# Patient Record
Sex: FEMALE | Race: WHITE | NOT HISPANIC OR LATINO | URBAN - METROPOLITAN AREA
[De-identification: names, ages, dates, MRNs, and addresses within clinical notes are randomized per-mention and may not be internally consistent; named-entity substitution may affect disease eponyms.]

---

## 2017-03-01 ENCOUNTER — OUTPATIENT (OUTPATIENT)
Dept: OUTPATIENT SERVICES | Facility: HOSPITAL | Age: 32
LOS: 1 days | End: 2017-03-01
Payer: COMMERCIAL

## 2017-03-01 DIAGNOSIS — O26.899 OTHER SPECIFIED PREGNANCY RELATED CONDITIONS, UNSPECIFIED TRIMESTER: ICD-10-CM

## 2017-03-01 DIAGNOSIS — Z3A.00 WEEKS OF GESTATION OF PREGNANCY NOT SPECIFIED: ICD-10-CM

## 2017-03-01 PROCEDURE — 99213 OFFICE O/P EST LOW 20 MIN: CPT | Mod: 25

## 2017-03-01 PROCEDURE — 76815 OB US LIMITED FETUS(S): CPT | Mod: 26

## 2017-04-01 ENCOUNTER — INPATIENT (INPATIENT)
Facility: HOSPITAL | Age: 32
LOS: 0 days | Discharge: HOME | End: 2017-04-02
Attending: OBSTETRICS & GYNECOLOGY | Admitting: OBSTETRICS & GYNECOLOGY
Payer: COMMERCIAL

## 2017-04-01 VITALS — SYSTOLIC BLOOD PRESSURE: 104 MMHG | HEART RATE: 85 BPM | DIASTOLIC BLOOD PRESSURE: 56 MMHG

## 2017-04-01 DIAGNOSIS — O47.1 FALSE LABOR AT OR AFTER 37 COMPLETED WEEKS OF GESTATION: ICD-10-CM

## 2017-04-01 DIAGNOSIS — Z34.80 ENCOUNTER FOR SUPERVISION OF OTHER NORMAL PREGNANCY, UNSPECIFIED TRIMESTER: ICD-10-CM

## 2017-04-01 LAB
BLD GP AB SCN SERPL QL: POSITIVE — SIGNIFICANT CHANGE UP
HBV SURFACE AG SERPL QL IA: SIGNIFICANT CHANGE UP
HCT VFR BLD CALC: 36.4 % — SIGNIFICANT CHANGE UP (ref 34.5–45)
HCV AB S/CO SERPL IA: 0.09 S/CO — SIGNIFICANT CHANGE UP
HCV AB SERPL-IMP: SIGNIFICANT CHANGE UP
HGB BLD-MCNC: 11.7 G/DL — SIGNIFICANT CHANGE UP (ref 11.5–15.5)
HIV 1 & 2 AB SERPL IA.RAPID: SIGNIFICANT CHANGE UP
HIV 1+2 AB+HIV1 P24 AG SERPL QL IA: SIGNIFICANT CHANGE UP
MCHC RBC-ENTMCNC: 28.3 PG — SIGNIFICANT CHANGE UP (ref 27–34)
MCHC RBC-ENTMCNC: 32.1 GM/DL — SIGNIFICANT CHANGE UP (ref 32–36)
MCV RBC AUTO: 88.1 FL — SIGNIFICANT CHANGE UP (ref 80–100)
PLATELET # BLD AUTO: 183 K/UL — SIGNIFICANT CHANGE UP (ref 150–400)
RBC # BLD: 4.13 M/UL — SIGNIFICANT CHANGE UP (ref 3.8–5.2)
RBC # FLD: 13.5 % — SIGNIFICANT CHANGE UP (ref 10.3–14.5)
RH IG SCN BLD-IMP: NEGATIVE — SIGNIFICANT CHANGE UP
RH IG SCN BLD-IMP: NEGATIVE — SIGNIFICANT CHANGE UP
T PALLIDUM AB TITR SER: NEGATIVE — SIGNIFICANT CHANGE UP
WBC # BLD: 14.37 K/UL — HIGH (ref 3.8–10.5)
WBC # FLD AUTO: 14.37 K/UL — HIGH (ref 3.8–10.5)

## 2017-04-01 PROCEDURE — 59409 OBSTETRICAL CARE: CPT | Mod: U9

## 2017-04-01 PROCEDURE — 86077 PHYS BLOOD BANK SERV XMATCH: CPT

## 2017-04-01 RX ORDER — SODIUM CHLORIDE 9 MG/ML
3 INJECTION INTRAMUSCULAR; INTRAVENOUS; SUBCUTANEOUS EVERY 8 HOURS
Qty: 0 | Refills: 0 | Status: DISCONTINUED | OUTPATIENT
Start: 2017-04-01 | End: 2017-04-02

## 2017-04-01 RX ORDER — DOCUSATE SODIUM 100 MG
100 CAPSULE ORAL
Qty: 0 | Refills: 0 | Status: DISCONTINUED | OUTPATIENT
Start: 2017-04-01 | End: 2017-04-02

## 2017-04-01 RX ORDER — OXYTOCIN 10 UNIT/ML
41.67 VIAL (ML) INJECTION
Qty: 20 | Refills: 0 | Status: DISCONTINUED | OUTPATIENT
Start: 2017-04-01 | End: 2017-04-02

## 2017-04-01 RX ORDER — DIBUCAINE 1 %
1 OINTMENT (GRAM) RECTAL EVERY 4 HOURS
Qty: 0 | Refills: 0 | Status: DISCONTINUED | OUTPATIENT
Start: 2017-04-01 | End: 2017-04-02

## 2017-04-01 RX ORDER — HYDROCORTISONE 1 %
1 OINTMENT (GRAM) TOPICAL EVERY 4 HOURS
Qty: 0 | Refills: 0 | Status: DISCONTINUED | OUTPATIENT
Start: 2017-04-01 | End: 2017-04-02

## 2017-04-01 RX ORDER — HYDROCORTISONE 1 %
1 OINTMENT (GRAM) TOPICAL EVERY 4 HOURS
Qty: 0 | Refills: 0 | Status: DISCONTINUED | OUTPATIENT
Start: 2017-04-01 | End: 2017-04-01

## 2017-04-01 RX ORDER — TETANUS TOXOID, REDUCED DIPHTHERIA TOXOID AND ACELLULAR PERTUSSIS VACCINE, ADSORBED 5; 2.5; 8; 8; 2.5 [IU]/.5ML; [IU]/.5ML; UG/.5ML; UG/.5ML; UG/.5ML
0.5 SUSPENSION INTRAMUSCULAR ONCE
Qty: 0 | Refills: 0 | Status: DISCONTINUED | OUTPATIENT
Start: 2017-04-01 | End: 2017-04-02

## 2017-04-01 RX ORDER — AER TRAVELER 0.5 G/1
1 SOLUTION RECTAL; TOPICAL EVERY 4 HOURS
Qty: 0 | Refills: 0 | Status: DISCONTINUED | OUTPATIENT
Start: 2017-04-01 | End: 2017-04-01

## 2017-04-01 RX ORDER — DIPHENHYDRAMINE HCL 50 MG
25 CAPSULE ORAL EVERY 6 HOURS
Qty: 0 | Refills: 0 | Status: DISCONTINUED | OUTPATIENT
Start: 2017-04-01 | End: 2017-04-02

## 2017-04-01 RX ORDER — OXYCODONE HYDROCHLORIDE 5 MG/1
5 TABLET ORAL
Qty: 0 | Refills: 0 | Status: DISCONTINUED | OUTPATIENT
Start: 2017-04-01 | End: 2017-04-02

## 2017-04-01 RX ORDER — SIMETHICONE 80 MG/1
80 TABLET, CHEWABLE ORAL EVERY 6 HOURS
Qty: 0 | Refills: 0 | Status: DISCONTINUED | OUTPATIENT
Start: 2017-04-01 | End: 2017-04-02

## 2017-04-01 RX ORDER — PRAMOXINE HYDROCHLORIDE 150 MG/15G
1 AEROSOL, FOAM RECTAL EVERY 4 HOURS
Qty: 0 | Refills: 0 | Status: DISCONTINUED | OUTPATIENT
Start: 2017-04-01 | End: 2017-04-01

## 2017-04-01 RX ORDER — KETOROLAC TROMETHAMINE 30 MG/ML
30 SYRINGE (ML) INJECTION ONCE
Qty: 0 | Refills: 0 | Status: DISCONTINUED | OUTPATIENT
Start: 2017-04-01 | End: 2017-04-01

## 2017-04-01 RX ORDER — HEPARIN SODIUM 5000 [USP'U]/ML
5000 INJECTION INTRAVENOUS; SUBCUTANEOUS EVERY 12 HOURS
Qty: 0 | Refills: 0 | Status: DISCONTINUED | OUTPATIENT
Start: 2017-04-01 | End: 2017-04-01

## 2017-04-01 RX ORDER — LANOLIN
1 OINTMENT (GRAM) TOPICAL EVERY 6 HOURS
Qty: 0 | Refills: 0 | Status: DISCONTINUED | OUTPATIENT
Start: 2017-04-01 | End: 2017-04-02

## 2017-04-01 RX ORDER — IBUPROFEN 200 MG
600 TABLET ORAL EVERY 6 HOURS
Qty: 0 | Refills: 0 | Status: DISCONTINUED | OUTPATIENT
Start: 2017-04-01 | End: 2017-04-02

## 2017-04-01 RX ORDER — MAGNESIUM HYDROXIDE 400 MG/1
30 TABLET, CHEWABLE ORAL
Qty: 0 | Refills: 0 | Status: DISCONTINUED | OUTPATIENT
Start: 2017-04-01 | End: 2017-04-02

## 2017-04-01 RX ORDER — PRAMOXINE HYDROCHLORIDE 150 MG/15G
1 AEROSOL, FOAM RECTAL EVERY 4 HOURS
Qty: 0 | Refills: 0 | Status: DISCONTINUED | OUTPATIENT
Start: 2017-04-01 | End: 2017-04-02

## 2017-04-01 RX ORDER — DIBUCAINE 1 %
1 OINTMENT (GRAM) RECTAL EVERY 4 HOURS
Qty: 0 | Refills: 0 | Status: DISCONTINUED | OUTPATIENT
Start: 2017-04-01 | End: 2017-04-01

## 2017-04-01 RX ORDER — ACETAMINOPHEN 500 MG
975 TABLET ORAL EVERY 6 HOURS
Qty: 0 | Refills: 0 | Status: DISCONTINUED | OUTPATIENT
Start: 2017-04-01 | End: 2017-04-02

## 2017-04-01 RX ORDER — OXYCODONE HYDROCHLORIDE 5 MG/1
5 TABLET ORAL EVERY 4 HOURS
Qty: 0 | Refills: 0 | Status: DISCONTINUED | OUTPATIENT
Start: 2017-04-01 | End: 2017-04-02

## 2017-04-01 RX ORDER — AER TRAVELER 0.5 G/1
1 SOLUTION RECTAL; TOPICAL EVERY 4 HOURS
Qty: 0 | Refills: 0 | Status: DISCONTINUED | OUTPATIENT
Start: 2017-04-01 | End: 2017-04-02

## 2017-04-01 RX ORDER — GLYCERIN ADULT
1 SUPPOSITORY, RECTAL RECTAL AT BEDTIME
Qty: 0 | Refills: 0 | Status: DISCONTINUED | OUTPATIENT
Start: 2017-04-01 | End: 2017-04-02

## 2017-04-01 RX ORDER — SODIUM CHLORIDE 9 MG/ML
3 INJECTION INTRAMUSCULAR; INTRAVENOUS; SUBCUTANEOUS EVERY 8 HOURS
Qty: 0 | Refills: 0 | Status: DISCONTINUED | OUTPATIENT
Start: 2017-04-01 | End: 2017-04-01

## 2017-04-01 RX ADMIN — Medication 975 MILLIGRAM(S): at 08:50

## 2017-04-01 RX ADMIN — Medication 600 MILLIGRAM(S): at 20:05

## 2017-04-01 RX ADMIN — Medication 30 MILLIGRAM(S): at 05:56

## 2017-04-01 RX ADMIN — Medication 600 MILLIGRAM(S): at 13:48

## 2017-04-01 RX ADMIN — Medication 125 MILLIUNIT(S)/MIN: at 05:32

## 2017-04-01 RX ADMIN — Medication 975 MILLIGRAM(S): at 08:20

## 2017-04-01 RX ADMIN — Medication 600 MILLIGRAM(S): at 19:31

## 2017-04-01 RX ADMIN — Medication 600 MILLIGRAM(S): at 13:26

## 2017-04-01 RX ADMIN — Medication 30 MILLIGRAM(S): at 06:25

## 2017-04-02 VITALS
RESPIRATION RATE: 18 BRPM | TEMPERATURE: 98 F | DIASTOLIC BLOOD PRESSURE: 60 MMHG | HEART RATE: 75 BPM | OXYGEN SATURATION: 97 % | SYSTOLIC BLOOD PRESSURE: 98 MMHG

## 2017-04-02 LAB
HCT VFR BLD CALC: 32.4 % — LOW (ref 34.5–45)
HGB BLD-MCNC: 10.9 G/DL — LOW (ref 11.5–15.5)
KLEIHAUER-BETKE CALCULATION: 0.1 % — SIGNIFICANT CHANGE UP (ref 0–0.3)
RUBV IGG SER-ACNC: 3.2 INDEX — SIGNIFICANT CHANGE UP
RUBV IGG SER-IMP: POSITIVE — SIGNIFICANT CHANGE UP

## 2017-04-02 PROCEDURE — 86900 BLOOD TYPING SEROLOGIC ABO: CPT

## 2017-04-02 PROCEDURE — 86901 BLOOD TYPING SEROLOGIC RH(D): CPT

## 2017-04-02 PROCEDURE — 87389 HIV-1 AG W/HIV-1&-2 AB AG IA: CPT

## 2017-04-02 PROCEDURE — 86762 RUBELLA ANTIBODY: CPT

## 2017-04-02 PROCEDURE — 59025 FETAL NON-STRESS TEST: CPT

## 2017-04-02 PROCEDURE — 86850 RBC ANTIBODY SCREEN: CPT

## 2017-04-02 PROCEDURE — 87340 HEPATITIS B SURFACE AG IA: CPT

## 2017-04-02 PROCEDURE — G0463: CPT

## 2017-04-02 PROCEDURE — 59050 FETAL MONITOR W/REPORT: CPT

## 2017-04-02 PROCEDURE — 85027 COMPLETE CBC AUTOMATED: CPT

## 2017-04-02 PROCEDURE — 86780 TREPONEMA PALLIDUM: CPT

## 2017-04-02 PROCEDURE — 85460 HEMOGLOBIN FETAL: CPT

## 2017-04-02 PROCEDURE — 86803 HEPATITIS C AB TEST: CPT

## 2017-04-02 PROCEDURE — 86703 HIV-1/HIV-2 1 RESULT ANTBDY: CPT

## 2017-04-02 PROCEDURE — 85018 HEMOGLOBIN: CPT

## 2017-04-02 PROCEDURE — 86870 RBC ANTIBODY IDENTIFICATION: CPT

## 2017-04-02 RX ORDER — ACETAMINOPHEN 500 MG
3 TABLET ORAL
Qty: 0 | Refills: 0 | COMMUNITY
Start: 2017-04-02

## 2017-04-02 RX ORDER — IBUPROFEN 200 MG
1 TABLET ORAL
Qty: 0 | Refills: 0 | COMMUNITY
Start: 2017-04-02

## 2017-04-02 RX ADMIN — Medication 600 MILLIGRAM(S): at 01:28

## 2017-04-02 RX ADMIN — Medication 975 MILLIGRAM(S): at 01:30

## 2017-04-02 RX ADMIN — Medication 600 MILLIGRAM(S): at 02:00

## 2017-04-02 RX ADMIN — Medication 975 MILLIGRAM(S): at 02:00

## 2017-04-02 RX ADMIN — Medication 600 MILLIGRAM(S): at 17:13

## 2017-04-02 NOTE — DISCHARGE NOTE OB - PROVIDER TOKENS
FREE:[LAST:[low risk clinic],PHONE:[phone]],FAX:[(   )    -],ADDRESS:[[address] [place] [address]]]

## 2017-04-02 NOTE — DISCHARGE NOTE OB - MATERIALS PROVIDED
Breastfeeding Log/Breastfeeding Mother’s Support Group Information/  Immunization Record/Guide to Postpartum Care/Vaccinations/Back To Sleep Handout/Breastfeeding Guide and Packet/Shaken Baby Prevention Handout

## 2017-04-02 NOTE — DISCHARGE NOTE OB - CARE PROVIDER_API CALL
[facility],   [address] [place] [address]  2nd [place] [address]  Phone: [phone]  Fax: (   )    -

## 2017-04-02 NOTE — DISCHARGE NOTE OB - CARE PLAN
Principal Discharge DX:	Vaginal delivery  Goal:	return to normal state of health  Instructions for follow-up, activity and diet:	Please follow up with the low risk clinic in 4-6 weeks for a postpartum visit. Call the office [phone] for an appointment. No strenuous activity or heavy lifting. Nothing in the vagina such as tampons, douching, sex, hot tubs, baths, or pools for 6 weeks. Notify your doctor if you have an increasing in vaginal bleeding, difficulty urinating, uncontrolled pain, inability to tolerate regular food, fevers, nausea, vomiting, or any other concerns. Principal Discharge DX:	Vaginal delivery  Goal:	return to normal state of health  Instructions for follow-up, activity and diet:	Please follow up with the low risk clinic in 4-6 weeks for a postpartum visit. Call the office [phone] for an appointment. No strenuous activity or heavy lifting. Nothing in the vagina such as tampons, douching, sex, hot tubs, baths, or pools for 6 weeks. Notify your doctor if you have an increasing in vaginal bleeding, difficulty urinating, uncontrolled pain, inability to tolerate regular food, fevers, nausea, vomiting, or any other concerns.

## 2017-04-02 NOTE — DISCHARGE NOTE OB - HOSPITAL COURSE
uncomplicated  followed by unremarkable postpartum course.  Hct: 36.4->32.4. On postpartum day 2, the patient was ambulating, voiding, tolerating diet, pain controlled, afebrile, and with normal vital signs.

## 2017-04-02 NOTE — DISCHARGE NOTE OB - PATIENT PORTAL LINK FT
“You can access the Follow[facility] Patient Portal, offered by [facility], by registering with the following website: [email or web address]”

## 2017-04-02 NOTE — DISCHARGE NOTE OB - MEDICATION SUMMARY - MEDICATIONS TO TAKE
I will START or STAY ON the medications listed below when I get home from the hospital:    acetaminophen 325 mg oral tablet  -- 3 tab(s) by mouth every 6 hours  -- Indication: For pain control    ibuprofen 600 mg oral tablet  -- 1 tab(s) by mouth every 6 hours  -- Indication: For pain control    Prenatal Multivitamins with Folic Acid 1 mg oral tablet  -- 1 tab(s) by mouth once a day  -- Indication: For vitamin

## 2018-03-07 ENCOUNTER — APPOINTMENT (OUTPATIENT)
Dept: ANTEPARTUM | Facility: CLINIC | Age: 33
End: 2018-03-07
Payer: MEDICAID

## 2018-03-07 ENCOUNTER — ASOB RESULT (OUTPATIENT)
Age: 33
End: 2018-03-07

## 2018-03-07 PROCEDURE — 76801 OB US < 14 WKS SINGLE FETUS: CPT

## 2018-04-13 ENCOUNTER — ASOB RESULT (OUTPATIENT)
Age: 33
End: 2018-04-13

## 2018-04-13 ENCOUNTER — APPOINTMENT (OUTPATIENT)
Dept: ANTEPARTUM | Facility: CLINIC | Age: 33
End: 2018-04-13
Payer: MEDICAID

## 2018-04-13 PROCEDURE — 76816 OB US FOLLOW-UP PER FETUS: CPT

## 2018-05-14 ENCOUNTER — INPATIENT (INPATIENT)
Facility: HOSPITAL | Age: 33
LOS: 0 days | Discharge: HOME | End: 2018-05-15
Attending: SPECIALIST | Admitting: SPECIALIST

## 2018-05-14 VITALS — HEIGHT: 59 IN | WEIGHT: 147.71 LBS

## 2018-05-14 DIAGNOSIS — O61.9 FAILED INDUCTION OF LABOR, UNSPECIFIED: ICD-10-CM

## 2018-05-14 LAB
BASOPHILS # BLD AUTO: 0.06 K/UL — SIGNIFICANT CHANGE UP (ref 0–0.2)
BASOPHILS NFR BLD AUTO: 0.6 % — SIGNIFICANT CHANGE UP (ref 0–2)
EOSINOPHIL # BLD AUTO: 0.35 K/UL — SIGNIFICANT CHANGE UP (ref 0–0.5)
EOSINOPHIL NFR BLD AUTO: 3.5 % — SIGNIFICANT CHANGE UP (ref 0–6)
HCT VFR BLD CALC: 39.5 % — SIGNIFICANT CHANGE UP (ref 34.5–45)
HGB BLD-MCNC: 13.1 G/DL — SIGNIFICANT CHANGE UP (ref 11.5–15.5)
IMM GRANULOCYTES # BLD AUTO: 0.02 # — SIGNIFICANT CHANGE UP
IMM GRANULOCYTES NFR BLD AUTO: 0.2 % — SIGNIFICANT CHANGE UP (ref 0–1.5)
LYMPHOCYTES # BLD AUTO: 3.37 K/UL — HIGH (ref 1–3.3)
LYMPHOCYTES # BLD AUTO: 33.8 % — SIGNIFICANT CHANGE UP (ref 13–44)
MCHC RBC-ENTMCNC: 29.6 PG — SIGNIFICANT CHANGE UP (ref 27–34)
MCHC RBC-ENTMCNC: 33.2 % — SIGNIFICANT CHANGE UP (ref 32–36)
MCV RBC AUTO: 89.2 FL — SIGNIFICANT CHANGE UP (ref 80–100)
MONOCYTES # BLD AUTO: 0.6 K/UL — SIGNIFICANT CHANGE UP (ref 0–0.9)
MONOCYTES NFR BLD AUTO: 6 % — SIGNIFICANT CHANGE UP (ref 2–14)
NEUTROPHILS # BLD AUTO: 5.56 K/UL — SIGNIFICANT CHANGE UP (ref 1.8–7.4)
NEUTROPHILS NFR BLD AUTO: 55.9 % — SIGNIFICANT CHANGE UP (ref 43–77)
NRBC # FLD: 0 — SIGNIFICANT CHANGE UP
PLATELET # BLD AUTO: 257 K/UL — SIGNIFICANT CHANGE UP (ref 150–400)
PMV BLD: 11.2 FL — SIGNIFICANT CHANGE UP (ref 7–13)
RBC # BLD: 4.43 M/UL — SIGNIFICANT CHANGE UP (ref 3.8–5.2)
RBC # FLD: 12.4 % — SIGNIFICANT CHANGE UP (ref 10.3–14.5)
WBC # BLD: 9.96 K/UL — SIGNIFICANT CHANGE UP (ref 3.8–10.5)
WBC # FLD AUTO: 9.96 K/UL — SIGNIFICANT CHANGE UP (ref 3.8–10.5)

## 2018-05-14 RX ORDER — OXYTOCIN 10 UNIT/ML
333.33 VIAL (ML) INJECTION
Qty: 20 | Refills: 0 | Status: COMPLETED | OUTPATIENT
Start: 2018-05-14 | End: 2018-05-15

## 2018-05-14 RX ORDER — SODIUM CHLORIDE 9 MG/ML
1000 INJECTION, SOLUTION INTRAVENOUS ONCE
Qty: 0 | Refills: 0 | Status: DISCONTINUED | OUTPATIENT
Start: 2018-05-14 | End: 2018-05-15

## 2018-05-14 RX ORDER — OXYTOCIN 10 UNIT/ML
333.33 VIAL (ML) INJECTION
Qty: 20 | Refills: 0 | Status: COMPLETED | OUTPATIENT
Start: 2018-05-14

## 2018-05-14 RX ORDER — SODIUM CHLORIDE 9 MG/ML
1000 INJECTION, SOLUTION INTRAVENOUS
Qty: 0 | Refills: 0 | Status: DISCONTINUED | OUTPATIENT
Start: 2018-05-14 | End: 2018-05-15

## 2018-05-14 RX ORDER — CITRIC ACID/SODIUM CITRATE 300-500 MG
15 SOLUTION, ORAL ORAL EVERY 4 HOURS
Qty: 0 | Refills: 0 | Status: DISCONTINUED | OUTPATIENT
Start: 2018-05-14 | End: 2018-05-15

## 2018-05-14 NOTE — PERINATAL/NEONATAL BEREAVEMENT NOTE - DISPOSITION
pathology (less than 24 weeks) pathology (less than 24 weeks)/[place] [facility] [place]

## 2018-05-15 ENCOUNTER — TRANSCRIPTION ENCOUNTER (OUTPATIENT)
Age: 33
End: 2018-05-15

## 2018-05-15 VITALS — SYSTOLIC BLOOD PRESSURE: 92 MMHG | HEART RATE: 66 BPM | DIASTOLIC BLOOD PRESSURE: 62 MMHG | RESPIRATION RATE: 16 BRPM

## 2018-05-15 LAB
BLD GP AB SCN SERPL QL: NEGATIVE — SIGNIFICANT CHANGE UP
RBC # BLD FETUS AUTO: 0 — SIGNIFICANT CHANGE UP
RH IG SCN BLD-IMP: NEGATIVE — SIGNIFICANT CHANGE UP

## 2018-05-15 RX ORDER — PRAMOXINE HYDROCHLORIDE 150 MG/15G
1 AEROSOL, FOAM RECTAL EVERY 4 HOURS
Qty: 0 | Refills: 0 | Status: DISCONTINUED | OUTPATIENT
Start: 2018-05-15 | End: 2018-05-15

## 2018-05-15 RX ORDER — DIBUCAINE 1 %
1 OINTMENT (GRAM) RECTAL EVERY 4 HOURS
Qty: 0 | Refills: 0 | Status: DISCONTINUED | OUTPATIENT
Start: 2018-05-15 | End: 2018-05-15

## 2018-05-15 RX ORDER — HYDROCORTISONE 1 %
1 OINTMENT (GRAM) TOPICAL EVERY 4 HOURS
Qty: 0 | Refills: 0 | Status: DISCONTINUED | OUTPATIENT
Start: 2018-05-15 | End: 2018-05-15

## 2018-05-15 RX ORDER — AER TRAVELER 0.5 G/1
1 SOLUTION RECTAL; TOPICAL EVERY 4 HOURS
Qty: 0 | Refills: 0 | Status: DISCONTINUED | OUTPATIENT
Start: 2018-05-15 | End: 2018-05-15

## 2018-05-15 RX ORDER — OXYTOCIN 10 UNIT/ML
41.67 VIAL (ML) INJECTION
Qty: 20 | Refills: 0 | Status: DISCONTINUED | OUTPATIENT
Start: 2018-05-15 | End: 2018-05-15

## 2018-05-15 RX ORDER — ACETAMINOPHEN 500 MG
1000 TABLET ORAL ONCE
Qty: 0 | Refills: 0 | Status: COMPLETED | OUTPATIENT
Start: 2018-05-15 | End: 2018-05-15

## 2018-05-15 RX ORDER — KETOROLAC TROMETHAMINE 30 MG/ML
30 SYRINGE (ML) INJECTION ONCE
Qty: 0 | Refills: 0 | Status: DISCONTINUED | OUTPATIENT
Start: 2018-05-15 | End: 2018-05-15

## 2018-05-15 RX ORDER — SODIUM CHLORIDE 9 MG/ML
3 INJECTION INTRAMUSCULAR; INTRAVENOUS; SUBCUTANEOUS EVERY 8 HOURS
Qty: 0 | Refills: 0 | Status: DISCONTINUED | OUTPATIENT
Start: 2018-05-15 | End: 2018-05-15

## 2018-05-15 RX ADMIN — Medication 400 MILLIGRAM(S): at 03:59

## 2018-05-15 RX ADMIN — SODIUM CHLORIDE 125 MILLILITER(S): 9 INJECTION, SOLUTION INTRAVENOUS at 00:00

## 2018-05-15 RX ADMIN — Medication 1000 MILLIUNIT(S)/MIN: at 05:35

## 2018-05-15 RX ADMIN — Medication 1000 MILLIGRAM(S): at 05:22

## 2018-05-15 RX ADMIN — Medication 125 MILLIUNIT(S)/MIN: at 06:10

## 2018-05-15 NOTE — DISCHARGE NOTE OB - CARE PROVIDER_API CALL
Zane Main), Obstetrics and Gynecology  [address] [place] [address]  Phone: [phone]  Fax: [phone]

## 2018-05-15 NOTE — DISCHARGE NOTE OB - PATIENT PORTAL LINK FT
You can access the [facility] Patient Portal, offered by [facility], by registering with the following website: [email or web address]

## 2018-05-15 NOTE — DISCHARGE NOTE OB - CARE PLAN
Principal Discharge DX:	Normal vaginal delivery  Goal:	discharge 5/15/18  Assessment and plan of treatment:	 IUFD

## 2018-05-15 NOTE — DISCHARGE NOTE OB - MEDICATION SUMMARY - MEDICATIONS TO TAKE
I will START or STAY ON the medications listed below when I get home from the hospital:    acetaminophen 325 mg oral tablet  -- 3 tab(s) by mouth every 6 hours  -- Indication: For pain    ibuprofen 600 mg oral tablet  -- 1 tab(s) by mouth every 6 hours  -- Indication: For pain    Prenatal Multivitamins with Folic Acid 1 mg oral tablet  -- 1 tab(s) by mouth once a day  -- Indication: For vitamin

## 2018-05-16 LAB
CMV IGM FLD-ACNC: <8 AU/ML — SIGNIFICANT CHANGE UP
CMV IGM SERPL QL: NEGATIVE — SIGNIFICANT CHANGE UP
MEV IGM SER-ACNC: <20 AU/ML — SIGNIFICANT CHANGE UP (ref 0–19.9)
T GONDII IGM SER QL: <3 AU/ML — SIGNIFICANT CHANGE UP
T GONDII IGM SER QL: NEGATIVE — SIGNIFICANT CHANGE UP
T PALLIDUM AB TITR SER: NEGATIVE — SIGNIFICANT CHANGE UP

## 2018-05-17 LAB
B19V IGG SER QL: NEGATIVE — SIGNIFICANT CHANGE UP
B19V IGG SER-ACNC: 0.2 INDEX — SIGNIFICANT CHANGE UP (ref 0–0.8)
B19V IGM FLD-ACNC: 0.2 INDEX — SIGNIFICANT CHANGE UP (ref 0–0.8)
B19V IGM SER-ACNC: NEGATIVE — SIGNIFICANT CHANGE UP

## 2018-07-19 NOTE — DISCHARGE NOTE OB - PLAN OF CARE
Adequate: hears normal conversation without difficulty return to normal state of health Please follow up with the low risk clinic in 4-6 weeks for a postpartum visit. Call the office [phone] for an appointment. No strenuous activity or heavy lifting. Nothing in the vagina such as tampons, douching, sex, hot tubs, baths, or pools for 6 weeks. Notify your doctor if you have an increasing in vaginal bleeding, difficulty urinating, uncontrolled pain, inability to tolerate regular food, fevers, nausea, vomiting, or any other concerns.

## 2018-11-26 ENCOUNTER — ASOB RESULT (OUTPATIENT)
Age: 33
End: 2018-11-26

## 2018-11-26 ENCOUNTER — APPOINTMENT (OUTPATIENT)
Dept: ANTEPARTUM | Facility: CLINIC | Age: 33
End: 2018-11-26
Payer: MEDICAID

## 2018-11-26 PROCEDURE — 76811 OB US DETAILED SNGL FETUS: CPT

## 2019-04-02 ENCOUNTER — TRANSCRIPTION ENCOUNTER (OUTPATIENT)
Age: 34
End: 2019-04-02

## 2019-04-02 ENCOUNTER — OUTPATIENT (OUTPATIENT)
Dept: INPATIENT UNIT | Facility: HOSPITAL | Age: 34
LOS: 1 days | Discharge: HOME | End: 2019-04-02
Payer: MEDICAID

## 2019-04-02 ENCOUNTER — INPATIENT (INPATIENT)
Facility: HOSPITAL | Age: 34
LOS: 1 days | Discharge: HOME | End: 2019-04-04
Attending: SPECIALIST | Admitting: SPECIALIST

## 2019-04-02 ENCOUNTER — OUTPATIENT (OUTPATIENT)
Dept: INPATIENT UNIT | Facility: HOSPITAL | Age: 34
LOS: 1 days | Discharge: HOME | End: 2019-04-02

## 2019-04-02 VITALS
HEART RATE: 90 BPM | RESPIRATION RATE: 16 BRPM | DIASTOLIC BLOOD PRESSURE: 66 MMHG | TEMPERATURE: 99 F | SYSTOLIC BLOOD PRESSURE: 124 MMHG

## 2019-04-02 VITALS — HEART RATE: 96 BPM | SYSTOLIC BLOOD PRESSURE: 106 MMHG | DIASTOLIC BLOOD PRESSURE: 67 MMHG

## 2019-04-02 VITALS
TEMPERATURE: 98 F | OXYGEN SATURATION: 100 % | DIASTOLIC BLOOD PRESSURE: 57 MMHG | HEART RATE: 94 BPM | RESPIRATION RATE: 16 BRPM | SYSTOLIC BLOOD PRESSURE: 105 MMHG

## 2019-04-02 VITALS — HEART RATE: 82 BPM | SYSTOLIC BLOOD PRESSURE: 107 MMHG | DIASTOLIC BLOOD PRESSURE: 62 MMHG

## 2019-04-02 VITALS
HEART RATE: 92 BPM | SYSTOLIC BLOOD PRESSURE: 126 MMHG | DIASTOLIC BLOOD PRESSURE: 74 MMHG | TEMPERATURE: 99 F | RESPIRATION RATE: 16 BRPM

## 2019-04-02 DIAGNOSIS — Z3A.00 WEEKS OF GESTATION OF PREGNANCY NOT SPECIFIED: ICD-10-CM

## 2019-04-02 DIAGNOSIS — O26.899 OTHER SPECIFIED PREGNANCY RELATED CONDITIONS, UNSPECIFIED TRIMESTER: ICD-10-CM

## 2019-04-02 DIAGNOSIS — Z98.891 HISTORY OF UTERINE SCAR FROM PREVIOUS SURGERY: Chronic | ICD-10-CM

## 2019-04-02 PROCEDURE — 59025 FETAL NON-STRESS TEST: CPT | Mod: 26

## 2019-04-02 NOTE — OB PROVIDER TRIAGE NOTE - NS_OBGYNHISTORY_OBGYN_ALL_OB_FT
obhx: 2011 primary c/s FTP-FT- 9lbs   - FT- 9lbs    - FT- 7lbs  2015  - FT- 7lbs  2016 SAB- complete  2017  - FT- 7lbs 6  2018- SAB- complete    gynhx: anastasia

## 2019-04-02 NOTE — OB PROVIDER TRIAGE NOTE - NSHPPHYSICALEXAM_GEN_ALL_CORE
VSS  abdomen soft nontender gravid   moderate variability accelerations no decelerations occasional contractions noted     VE performed by Hilario Sosa MD  patient noted to be 4/60/-2

## 2019-04-02 NOTE — OB PROVIDER TRIAGE NOTE - ADDITIONAL INSTRUCTIONS
Fetal kick counts discussed  labor precautions discussed  patient to return in two hours    patient stable

## 2019-04-02 NOTE — OB RN TRIAGE NOTE - PMH
Normal vaginal delivery  2012 tolac 9lbs  2014 tolac 7lbs  2015 tolac 7lbs  2017 tolac 7-6  Spontaneous   x2

## 2019-04-02 NOTE — OB PROVIDER TRIAGE NOTE - NSOBPROVIDERNOTE_OBGYN_ALL_OB_FT
VE performed at patient bedside with MD. Sosa patient noted to be 4/80/-2  Patient seen by Dr. Sosa at patient bedside  Plan of care discussed VE performed at patient bedside with MD. Sosa patient noted to be 4/80/-2      Sonogram performed  BPP 8/8  BECKIE 11.02  Cephalic; Posterior Placenta  Patient seen by Dr. Sosa at patient bedside  Plan of care discussed  Patient to ambulate    Fetal kick counts discussed  labor precautions discussed  patient to return in two hours

## 2019-04-02 NOTE — OB RN TRIAGE NOTE - PMH
Normal vaginal delivery  2012 tolac 9lbs  2014 tolac 7lbs  2015 tolac 7lbs  2017 tolac 7-6 Normal vaginal delivery  2012 tolac 9lbs  2014 tolac 7lbs  2015 tolac 7lbs  2017 tolac 7-6  Spontaneous   x2

## 2019-04-02 NOTE — OB PROVIDER TRIAGE NOTE - HISTORY OF PRESENT ILLNESS
34y.o.  at 40.2weeks presenting with irregular contractions beginning yesterday evening at 1900 growing in intensity.  Patient reports going to MD office for evaluation of contractions patient noted to be 4cm in office at 10am.  Patient denies LOF, denies vaginal bleeding and reports good fetal movement.     a/p course uncomplicated

## 2019-04-02 NOTE — OB PROVIDER TRIAGE NOTE - LABOR: CERVICAL EFFACEMENT
right sided flank pain.  As per pt saw PMD and started on Cipro, but feels its not helping.  Pt also c/o chest pain on and off since last night
50-74%

## 2019-04-03 LAB
BASOPHILS # BLD AUTO: 0.06 K/UL — SIGNIFICANT CHANGE UP (ref 0–0.2)
BASOPHILS NFR BLD AUTO: 0.5 % — SIGNIFICANT CHANGE UP (ref 0–2)
BLD GP AB SCN SERPL QL: NEGATIVE — SIGNIFICANT CHANGE UP
EOSINOPHIL # BLD AUTO: 0.19 K/UL — SIGNIFICANT CHANGE UP (ref 0–0.5)
EOSINOPHIL NFR BLD AUTO: 1.5 % — SIGNIFICANT CHANGE UP (ref 0–6)
HCT VFR BLD CALC: 40.7 % — SIGNIFICANT CHANGE UP (ref 34.5–45)
HGB BLD-MCNC: 13.1 G/DL — SIGNIFICANT CHANGE UP (ref 11.5–15.5)
IMM GRANULOCYTES NFR BLD AUTO: 0.4 % — SIGNIFICANT CHANGE UP (ref 0–1.5)
LYMPHOCYTES # BLD AUTO: 2.67 K/UL — SIGNIFICANT CHANGE UP (ref 1–3.3)
LYMPHOCYTES # BLD AUTO: 21.7 % — SIGNIFICANT CHANGE UP (ref 13–44)
MCHC RBC-ENTMCNC: 27.6 PG — SIGNIFICANT CHANGE UP (ref 27–34)
MCHC RBC-ENTMCNC: 32.2 % — SIGNIFICANT CHANGE UP (ref 32–36)
MCV RBC AUTO: 85.7 FL — SIGNIFICANT CHANGE UP (ref 80–100)
MONOCYTES # BLD AUTO: 0.74 K/UL — SIGNIFICANT CHANGE UP (ref 0–0.9)
MONOCYTES NFR BLD AUTO: 6 % — SIGNIFICANT CHANGE UP (ref 2–14)
NEUTROPHILS # BLD AUTO: 8.61 K/UL — HIGH (ref 1.8–7.4)
NEUTROPHILS NFR BLD AUTO: 69.9 % — SIGNIFICANT CHANGE UP (ref 43–77)
NRBC # FLD: 0 K/UL — SIGNIFICANT CHANGE UP (ref 0–0)
PLATELET # BLD AUTO: 250 K/UL — SIGNIFICANT CHANGE UP (ref 150–400)
PMV BLD: 12.1 FL — SIGNIFICANT CHANGE UP (ref 7–13)
RBC # BLD FETUS AUTO: 0 — SIGNIFICANT CHANGE UP
RBC # BLD: 4.75 M/UL — SIGNIFICANT CHANGE UP (ref 3.8–5.2)
RBC # FLD: 13.8 % — SIGNIFICANT CHANGE UP (ref 10.3–14.5)
RH IG SCN BLD-IMP: NEGATIVE — SIGNIFICANT CHANGE UP
T PALLIDUM AB TITR SER: NEGATIVE — SIGNIFICANT CHANGE UP
WBC # BLD: 12.32 K/UL — HIGH (ref 3.8–10.5)
WBC # FLD AUTO: 12.32 K/UL — HIGH (ref 3.8–10.5)

## 2019-04-03 RX ORDER — AER TRAVELER 0.5 G/1
1 SOLUTION RECTAL; TOPICAL EVERY 4 HOURS
Qty: 0 | Refills: 0 | Status: DISCONTINUED | OUTPATIENT
Start: 2019-04-03 | End: 2019-04-03

## 2019-04-03 RX ORDER — ACETAMINOPHEN 500 MG
975 TABLET ORAL EVERY 6 HOURS
Qty: 0 | Refills: 0 | Status: DISCONTINUED | OUTPATIENT
Start: 2019-04-03 | End: 2019-04-04

## 2019-04-03 RX ORDER — DOCUSATE SODIUM 100 MG
100 CAPSULE ORAL
Qty: 0 | Refills: 0 | Status: DISCONTINUED | OUTPATIENT
Start: 2019-04-03 | End: 2019-04-04

## 2019-04-03 RX ORDER — SODIUM CHLORIDE 9 MG/ML
1000 INJECTION, SOLUTION INTRAVENOUS
Qty: 0 | Refills: 0 | Status: DISCONTINUED | OUTPATIENT
Start: 2019-04-03 | End: 2019-04-03

## 2019-04-03 RX ORDER — MAGNESIUM HYDROXIDE 400 MG/1
30 TABLET, CHEWABLE ORAL
Qty: 0 | Refills: 0 | Status: DISCONTINUED | OUTPATIENT
Start: 2019-04-03 | End: 2019-04-04

## 2019-04-03 RX ORDER — ACETAMINOPHEN 500 MG
2 TABLET ORAL
Qty: 0 | Refills: 0 | COMMUNITY

## 2019-04-03 RX ORDER — SIMETHICONE 80 MG/1
80 TABLET, CHEWABLE ORAL EVERY 6 HOURS
Qty: 0 | Refills: 0 | Status: DISCONTINUED | OUTPATIENT
Start: 2019-04-03 | End: 2019-04-04

## 2019-04-03 RX ORDER — TETANUS TOXOID, REDUCED DIPHTHERIA TOXOID AND ACELLULAR PERTUSSIS VACCINE, ADSORBED 5; 2.5; 8; 8; 2.5 [IU]/.5ML; [IU]/.5ML; UG/.5ML; UG/.5ML; UG/.5ML
0.5 SUSPENSION INTRAMUSCULAR ONCE
Qty: 0 | Refills: 0 | Status: DISCONTINUED | OUTPATIENT
Start: 2019-04-03 | End: 2019-04-04

## 2019-04-03 RX ORDER — IBUPROFEN 200 MG
600 TABLET ORAL EVERY 6 HOURS
Qty: 0 | Refills: 0 | Status: COMPLETED | OUTPATIENT
Start: 2019-04-03 | End: 2020-03-01

## 2019-04-03 RX ORDER — GLYCERIN ADULT
1 SUPPOSITORY, RECTAL RECTAL AT BEDTIME
Qty: 0 | Refills: 0 | Status: DISCONTINUED | OUTPATIENT
Start: 2019-04-03 | End: 2019-04-04

## 2019-04-03 RX ORDER — PRAMOXINE HYDROCHLORIDE 150 MG/15G
1 AEROSOL, FOAM RECTAL EVERY 4 HOURS
Qty: 0 | Refills: 0 | Status: DISCONTINUED | OUTPATIENT
Start: 2019-04-03 | End: 2019-04-03

## 2019-04-03 RX ORDER — HYDROCORTISONE 1 %
1 OINTMENT (GRAM) TOPICAL EVERY 4 HOURS
Qty: 0 | Refills: 0 | Status: DISCONTINUED | OUTPATIENT
Start: 2019-04-03 | End: 2019-04-03

## 2019-04-03 RX ORDER — SODIUM CHLORIDE 9 MG/ML
125 INJECTION, SOLUTION INTRAVENOUS ONCE
Qty: 0 | Refills: 0 | Status: DISCONTINUED | OUTPATIENT
Start: 2019-04-03 | End: 2019-04-03

## 2019-04-03 RX ORDER — OXYTOCIN 10 UNIT/ML
41.67 VIAL (ML) INJECTION
Qty: 20 | Refills: 0 | Status: DISCONTINUED | OUTPATIENT
Start: 2019-04-03 | End: 2019-04-03

## 2019-04-03 RX ORDER — LANOLIN
1 OINTMENT (GRAM) TOPICAL EVERY 6 HOURS
Qty: 0 | Refills: 0 | Status: DISCONTINUED | OUTPATIENT
Start: 2019-04-03 | End: 2019-04-04

## 2019-04-03 RX ORDER — OXYCODONE HYDROCHLORIDE 5 MG/1
5 TABLET ORAL
Qty: 0 | Refills: 0 | Status: DISCONTINUED | OUTPATIENT
Start: 2019-04-03 | End: 2019-04-04

## 2019-04-03 RX ORDER — DIBUCAINE 1 %
1 OINTMENT (GRAM) RECTAL EVERY 4 HOURS
Qty: 0 | Refills: 0 | Status: DISCONTINUED | OUTPATIENT
Start: 2019-04-03 | End: 2019-04-03

## 2019-04-03 RX ORDER — HYDROCORTISONE 1 %
1 OINTMENT (GRAM) TOPICAL EVERY 4 HOURS
Qty: 0 | Refills: 0 | Status: DISCONTINUED | OUTPATIENT
Start: 2019-04-03 | End: 2019-04-04

## 2019-04-03 RX ORDER — OXYTOCIN 10 UNIT/ML
333.33 VIAL (ML) INJECTION
Qty: 20 | Refills: 0 | Status: DISCONTINUED | OUTPATIENT
Start: 2019-04-03 | End: 2019-04-03

## 2019-04-03 RX ORDER — IBUPROFEN 200 MG
600 TABLET ORAL EVERY 6 HOURS
Qty: 0 | Refills: 0 | Status: DISCONTINUED | OUTPATIENT
Start: 2019-04-03 | End: 2019-04-04

## 2019-04-03 RX ORDER — AER TRAVELER 0.5 G/1
1 SOLUTION RECTAL; TOPICAL EVERY 4 HOURS
Qty: 0 | Refills: 0 | Status: DISCONTINUED | OUTPATIENT
Start: 2019-04-03 | End: 2019-04-04

## 2019-04-03 RX ORDER — SODIUM CHLORIDE 9 MG/ML
3 INJECTION INTRAMUSCULAR; INTRAVENOUS; SUBCUTANEOUS EVERY 8 HOURS
Qty: 0 | Refills: 0 | Status: DISCONTINUED | OUTPATIENT
Start: 2019-04-03 | End: 2019-04-03

## 2019-04-03 RX ORDER — ACETAMINOPHEN 500 MG
975 TABLET ORAL EVERY 6 HOURS
Qty: 0 | Refills: 0 | Status: COMPLETED | OUTPATIENT
Start: 2019-04-03 | End: 2020-03-01

## 2019-04-03 RX ORDER — DIPHENHYDRAMINE HCL 50 MG
25 CAPSULE ORAL EVERY 6 HOURS
Qty: 0 | Refills: 0 | Status: DISCONTINUED | OUTPATIENT
Start: 2019-04-03 | End: 2019-04-04

## 2019-04-03 RX ORDER — OXYCODONE HYDROCHLORIDE 5 MG/1
5 TABLET ORAL EVERY 4 HOURS
Qty: 0 | Refills: 0 | Status: DISCONTINUED | OUTPATIENT
Start: 2019-04-03 | End: 2019-04-04

## 2019-04-03 RX ORDER — CITRIC ACID/SODIUM CITRATE 300-500 MG
15 SOLUTION, ORAL ORAL EVERY 4 HOURS
Qty: 0 | Refills: 0 | Status: DISCONTINUED | OUTPATIENT
Start: 2019-04-03 | End: 2019-04-03

## 2019-04-03 RX ORDER — PRAMOXINE HYDROCHLORIDE 150 MG/15G
1 AEROSOL, FOAM RECTAL EVERY 4 HOURS
Qty: 0 | Refills: 0 | Status: DISCONTINUED | OUTPATIENT
Start: 2019-04-03 | End: 2019-04-04

## 2019-04-03 RX ORDER — SODIUM CHLORIDE 9 MG/ML
3 INJECTION INTRAMUSCULAR; INTRAVENOUS; SUBCUTANEOUS EVERY 8 HOURS
Qty: 0 | Refills: 0 | Status: DISCONTINUED | OUTPATIENT
Start: 2019-04-03 | End: 2019-04-04

## 2019-04-03 RX ORDER — DIBUCAINE 1 %
1 OINTMENT (GRAM) RECTAL EVERY 4 HOURS
Qty: 0 | Refills: 0 | Status: DISCONTINUED | OUTPATIENT
Start: 2019-04-03 | End: 2019-04-04

## 2019-04-03 RX ORDER — IBUPROFEN 200 MG
1 TABLET ORAL
Qty: 0 | Refills: 0 | COMMUNITY

## 2019-04-03 RX ORDER — KETOROLAC TROMETHAMINE 30 MG/ML
30 SYRINGE (ML) INJECTION ONCE
Qty: 0 | Refills: 0 | Status: DISCONTINUED | OUTPATIENT
Start: 2019-04-03 | End: 2019-04-03

## 2019-04-03 RX ADMIN — Medication 600 MILLIGRAM(S): at 15:26

## 2019-04-03 RX ADMIN — Medication 975 MILLIGRAM(S): at 16:20

## 2019-04-03 RX ADMIN — Medication 125 MILLIUNIT(S)/MIN: at 02:26

## 2019-04-03 RX ADMIN — Medication 600 MILLIGRAM(S): at 16:20

## 2019-04-03 RX ADMIN — Medication 30 MILLIGRAM(S): at 00:52

## 2019-04-03 RX ADMIN — SODIUM CHLORIDE 3 MILLILITER(S): 9 INJECTION INTRAMUSCULAR; INTRAVENOUS; SUBCUTANEOUS at 14:00

## 2019-04-03 RX ADMIN — Medication 975 MILLIGRAM(S): at 15:27

## 2019-04-03 NOTE — DISCHARGE NOTE OB - MATERIALS PROVIDED
Breastfeeding Log/Guide to Postpartum Care/Birth Certificate Instructions/Shaken Baby Prevention Handout

## 2019-04-03 NOTE — DISCHARGE NOTE OB - MEDICATION SUMMARY - MEDICATIONS TO STOP TAKING
I will STOP taking the medications listed below when I get home from the hospital:    ibuprofen 600 mg oral tablet  -- 1 tab(s) by mouth every 6 hours    Tylenol 325 mg oral tablet  -- 2 tab(s) by mouth every 4 hours

## 2019-04-03 NOTE — DISCHARGE NOTE OB - CARE PROVIDER_API CALL
Hilario Sosa)  Obstetrics and Gynecology  [address] [facility], [address] [place] [address]  Phone: [phone]  Fax: [phone]  Follow Up Time:

## 2019-04-03 NOTE — DISCHARGE NOTE OB - PATIENT PORTAL LINK FT
You can access the [facility] Patient Portal, offered by [facility], by registering with the following website: [email or web address]

## 2019-04-03 NOTE — DISCHARGE NOTE OB - MEDICATION SUMMARY - MEDICATIONS TO TAKE
I will START or STAY ON the medications listed below when I get home from the hospital:    ibuprofen 600 mg oral tablet  -- 1 tab(s) by mouth every 6 hours  -- Indication: For pain    Tylenol 325 mg oral tablet  -- 2 tab(s) by mouth every 4 hours  -- Indication: For pain    Prenatal Multivitamins Folic Acid 1 mg oral tablet  -- 1 tab(s) by mouth once a day  -- Indication: For vitamins

## 2019-04-03 NOTE — OB PROVIDER DELIVERY SUMMARY - NSPROVIDERDELIVERYNOTE_OBGYN_ALL_OB_FT
nsd girl  s/p amnioinfusion/position change/o2 and IV fluid bolus for variable decels  rapid progress from 6 cm to FD.   apgarrs 9/9  no lacerations  immediate pp condition was stable

## 2019-04-03 NOTE — OB NEONATOLOGY/PEDIATRICIAN DELIVERY SUMMARY - NSPEDSNEONOTESA_OBGYN_ALL_OB_FT
NICOLE is our baby girl born at 40.3 wks via  to a 33 y/o  O- blood type mother (received rhogam at 28 weeks). Maternal history of prior primary C/S in  along with 4 prior NSVDs and 1 loss and 1 missed. PNL HIV neg, Hep B neg, RPR non-reactive, and rubella UNKNOWN. GBS unknown and not treated. SROM at 23:30 with clear fluids. Baby emerged with good tone, color, and cry, was w/d/s/s. APGARS 9/9. Mom would like to breastfeed, and defers Hep B (will do at pediatrician). Highest maternal temp, 37C.EOS 0.08.

## 2019-04-03 NOTE — OB PROVIDER H&P - ASSESSMENT
33y/o  at 40+2wks presents in labor. GBS neg.     -Admission labs   -NPO/IVF  -EFM/[place]   -Expectant management     Dr. Cartagena aware   Kim Woodruff PGY-1

## 2019-04-04 VITALS
RESPIRATION RATE: 18 BRPM | DIASTOLIC BLOOD PRESSURE: 65 MMHG | HEART RATE: 74 BPM | TEMPERATURE: 98 F | OXYGEN SATURATION: 98 % | SYSTOLIC BLOOD PRESSURE: 100 MMHG

## 2019-04-04 DIAGNOSIS — O34.219 MATERNAL CARE FOR UNSPECIFIED TYPE SCAR FROM PREVIOUS CESAREAN DELIVERY: ICD-10-CM

## 2019-04-04 NOTE — PROGRESS NOTE ADULT - SUBJECTIVE AND OBJECTIVE BOX
S: Patient doing well.     Pain controlled.  +OOB.  +void.    Tolerating PO.  Lochia WNL.    O: Vital Signs Last 24 Hrs  T(C): 37 (2019 17:49), Max: 37 (2019 17:49)  T(F): 98.6 (2019 17:49), Max: 98.6 (2019 17:49)  HR: 84 (2019 17:49) (84 - 98)  BP: 107/61 (2019 17:49) (95/59 - 108/63)  BP(mean): --  RR: 18 (2019 17:49) (16 - 18)  SpO2: 99% (2019 17:49) (98% - 99%)      Pt without complaints  vital signs stable  Abdomen soft  fundus firm, non tender  extremities non tender  lochia wnl    Patient doing well  Routine post partum care    Labs:                        13.1   12.32 )-----------( 250      ( 2019 00:20 )             40.7       ABO Interpretation: O ( @ 00:32)    Rh Interpretation: Negative ( @ 00:32)    Antibody Screen Negative      A: 34y s/p  doing well.   -Continue routine postpartum care.  -Discharge planned for tomorrow

## 2019-04-04 NOTE — PROGRESS NOTE ADULT - PROBLEM SELECTOR PLAN 1
- Pain well controlled, continue current pain regimen  - Increase ambulation, SCDs when not ambulating  - Continue regular diet    Rebeca Carrera

## 2019-04-04 NOTE — PROGRESS NOTE ADULT - SUBJECTIVE AND OBJECTIVE BOX
OB Progress Note:  PPD#1    S: 33yo  PPD#1 s/p . Patient feels well. Pain is well controlled. She is tolerating a regular diet and passing flatus. She is voiding spontaneously, and ambulating without difficulty. Denies CP/SOB. Denies lightheadedness/dizziness. Denies N/V.    O:  Vitals:  Vital Signs Last 24 Hrs  T(C): 36.8 (2019 05:53), Max: 37 (2019 17:49)  T(F): 98.2 (2019 05:53), Max: 98.6 (2019 17:49)  HR: 74 (2019 05:53) (74 - 98)  BP: 100/65 (2019 05:53) (95/59 - 108/63)  BP(mean): --  RR: 18 (2019 05:53) (16 - 18)  SpO2: 98% (2019 05:53) (98% - 99%)    MEDICATIONS  (STANDING):  acetaminophen   Tablet .. 975 milliGRAM(s) Oral every 6 hours  diphtheria/tetanus/pertussis (acellular) Vaccine (ADAcel) 0.5 milliLiter(s) IntraMuscular once  ibuprofen  Tablet. 600 milliGRAM(s) Oral every 6 hours  oxyCODONE    IR 5 milliGRAM(s) Oral every 3 hours  prenatal multivitamin 1 Tablet(s) Oral daily  sodium chloride 0.9% lock flush 3 milliLiter(s) IV Push every 8 hours      Labs:  Blood type: O Negative  Rubella IgG: RPR: Negative                          13.1   12.32<H> >-----------< 250    (  @ 00:20 )             40.7                  Physical Exam:  General: NAD  Abdomen: soft, non-tender, non-distended, fundus firm  Vaginal: Lochia wnl  Extremities: No erythema/edema

## 2019-07-11 PROBLEM — O03.9 COMPLETE OR UNSPECIFIED SPONTANEOUS ABORTION WITHOUT COMPLICATION: Chronic | Status: ACTIVE | Noted: 2019-04-02

## 2019-07-18 ENCOUNTER — APPOINTMENT (OUTPATIENT)
Dept: GASTROENTEROLOGY | Facility: CLINIC | Age: 34
End: 2019-07-18
Payer: COMMERCIAL

## 2019-07-18 VITALS
HEART RATE: 91 BPM | BODY MASS INDEX: 37.46 KG/M2 | WEIGHT: 185.8 LBS | DIASTOLIC BLOOD PRESSURE: 66 MMHG | TEMPERATURE: 98 F | SYSTOLIC BLOOD PRESSURE: 118 MMHG | OXYGEN SATURATION: 98 % | HEIGHT: 59 IN

## 2019-07-18 DIAGNOSIS — Z82.0 FAMILY HISTORY OF EPILEPSY AND OTHER DISEASES OF THE NERVOUS SYSTEM: ICD-10-CM

## 2019-07-18 DIAGNOSIS — K62.5 HEMORRHAGE OF ANUS AND RECTUM: ICD-10-CM

## 2019-07-18 DIAGNOSIS — Z82.49 FAMILY HISTORY OF ISCHEMIC HEART DISEASE AND OTHER DISEASES OF THE CIRCULATORY SYSTEM: ICD-10-CM

## 2019-07-18 DIAGNOSIS — Z87.891 PERSONAL HISTORY OF NICOTINE DEPENDENCE: ICD-10-CM

## 2019-07-18 DIAGNOSIS — K62.89 OTHER SPECIFIED DISEASES OF ANUS AND RECTUM: ICD-10-CM

## 2019-07-18 PROCEDURE — 82270 OCCULT BLOOD FECES: CPT

## 2019-07-18 PROCEDURE — 99204 OFFICE O/P NEW MOD 45 MIN: CPT | Mod: 25

## 2019-07-18 RX ORDER — ERGOCALCIFEROL (VITAMIN D2) 1250 MCG
50000 CAPSULE ORAL
Refills: 0 | Status: ACTIVE | COMMUNITY

## 2019-07-18 RX ORDER — HYDROCORTISONE 25 MG/G
2.5 CREAM TOPICAL
Qty: 1 | Refills: 2 | Status: ACTIVE | COMMUNITY
Start: 2019-07-18 | End: 1900-01-01

## 2019-07-18 RX ORDER — HYDROCORTISONE ACETATE 25 MG/1
25 SUPPOSITORY RECTAL
Qty: 40 | Refills: 1 | Status: ACTIVE | COMMUNITY
Start: 2019-07-18 | End: 1900-01-01

## 2019-07-18 NOTE — CONSULT LETTER
[FreeTextEntry1] : Dear Dr. Zane Main,\par \par I had the pleasure of seeing your patient JACINTO RIVERA in the office today.  My office note is attached.\par \par Thank you very much for allowing me to participate in the care of your patient.\par \par Sincerely,\par \par Victor Manuel Hnoeycutt M.D., FACG, FACP\par Director, Celiac Program at [facility]\par  of Medicine\par [employer] at [facility] Practice Director,\par [facility] - Gastroenterology/Internal Medicine at [place]\[facility] [address]\[place] [address]\par Tel: [phone]\par Email: [email or web address]\par \par \par The attached note has been created using a voice recognition system (Dragon).  There may be some misspellings and typos.  Please call my office if you have any issues or questions.

## 2019-07-18 NOTE — ASSESSMENT
[FreeTextEntry1] : Patient 3-1/2 months postpartum with her 6 child. She has rectal pain and bleeding. Rectal examination reveals external hemorrhoids with the presence of an anal fissure. Stool is guaiac positive. Presumably, the blood is a consequence of the hemorrhoids and fissure.\par \par Patient was counseled regarding the importance of fiber and water. She was advised to use Citrucel powder daily as a fiber supplement and to drink at least 64 ounces of water a day.\par \par Patient was given hydrocortisone 2-1/2% cream to apply to the external hemorrhoids and hydrocortisone suppositories 25 mg. She was advised used to cream 3 times a day and the suppositories twice a day for 10 days.\par \par Patient was advised to contact me if her symptoms worsen or do not improve. Otherwise, the patient will return to see me in one month. At that time, we will assess to be sure that the stool does not remain positive for blood.

## 2019-07-18 NOTE — HISTORY OF PRESENT ILLNESS
[FreeTextEntry1] : The patient is a 34-year-old woman who gave birth to a baby 3-1/2 months ago. One month later, the patient developed diarrhea which lasted less than a day. The patient then developed rectal soreness. The next day, she was constipated and strain and digitally removed stool. Since then, the patient has had pain with every bowel movement. She also saw bright red blood on the stool and toilet paper which has been improving. She has one to 2 bowel movements a day. She increased her fiber in her diet and noticed improvement in the pain although it has worsened a little recently. She denies abdominal pain, heartburn, or dysphagia. The patient has been losing her baby weight. The patient has not been hospitalized in the past year and denies any cardiac issues.

## 2019-07-18 NOTE — PHYSICAL EXAM
[General Appearance - Alert] : alert [General Appearance - In No Acute Distress] : in no acute distress [Neck Appearance] : the appearance of the neck was normal [Neck Cervical Mass (___cm)] : no neck mass was observed [Jugular Venous Distention Increased] : there was no jugular-venous distention [Thyroid Diffuse Enlargement] : the thyroid was not enlarged [Thyroid Nodule] : there were no palpable thyroid nodules [Auscultation Breath Sounds / Voice Sounds] : lungs were clear to auscultation bilaterally [Heart Rate And Rhythm] : heart rate was normal and rhythm regular [Heart Sounds] : normal S1 and S2 [Heart Sounds Gallop] : no gallops [Murmurs] : no murmurs [Heart Sounds Pericardial Friction Rub] : no pericardial rub [Bowel Sounds] : normal bowel sounds [Abdomen Soft] : soft [Abdomen Tenderness] : non-tender [] : no hepato-splenomegaly [Abdomen Mass (___ Cm)] : no abdominal mass palpated [Normal Sphincter Tone] : normal sphincter tone [No Rectal Mass] : no rectal mass [External Hemorrhoid] : external hemorrhoids [Occult Blood Positive] : stool positive for occult blood [No CVA Tenderness] : no ~M costovertebral angle tenderness [No Spinal Tenderness] : no spinal tenderness [Oriented To Time, Place, And Person] : oriented to person, place, and time [Impaired Insight] : insight and judgment were intact [Affect] : the affect was normal [FreeTextEntry1] : small anal fissure noted

## 2019-08-16 ENCOUNTER — APPOINTMENT (OUTPATIENT)
Dept: GASTROENTEROLOGY | Facility: CLINIC | Age: 34
End: 2019-08-16

## 2020-04-18 NOTE — PATIENT PROFILE OB - TEACHING/LEARNING FACTORS INFLUENCE READINESS TO LEARN
Patient: Donna Edwards Date: 2020   Preferred name: Donna    : 1964 55 year old female     Pt. arrived for first HBOT. Vascular Surgery team called (I discussed with EMI Carrizales.) They wish to prioritize transfusion over HBO treatment today as pt. has been requiring pressor and albumin.    WBC (K/mcL)   Date Value   2020 23.6 (H)   01/15/2020 5.6     RBC (mil/mcL)   Date Value   2020 2.86 (L)   01/15/2020 4.87     HCT (%)   Date Value   2020 26.7 (L)   01/15/2020 42.4     HGB (g/dL)   Date Value   2020 8.3 (L)   01/15/2020 13.6     PLT (K/mcL)   Date Value   2020 327   01/15/2020 238   2013 296     I reviewed notes and photos and agree, so pt. sent back to ICU. We have her scheduled for HBOT tomorrow.         Zach Jesus MD [facility]:  [phone]   Pager [phone] [facility]:  [phone]             none

## 2020-04-28 ENCOUNTER — APPOINTMENT (OUTPATIENT)
Dept: GASTROENTEROLOGY | Facility: CLINIC | Age: 35
End: 2020-04-28
Payer: COMMERCIAL

## 2020-04-28 PROCEDURE — 99215 OFFICE O/P EST HI 40 MIN: CPT | Mod: 95

## 2020-04-28 RX ORDER — MULTIVIT-MIN/IRON/FOLIC ACID/K 18-600-40
CAPSULE ORAL
Refills: 0 | Status: ACTIVE | COMMUNITY

## 2020-04-28 NOTE — CONSULT LETTER
[FreeTextEntry1] : Dear Dr. Zane Main,\[facility] \[facility] I had the pleasure of seeing your patient JACINTO RIVERA in the office today.  My office note is attached. PLEASE READ THE "ASSESSMENT" SECTION OF THE NOTE TO SEE MY IMPRESSION AND PLAN.\[facility] \[facility] Thank you very much for allowing me to participate in the care of your patient.\[facility] \[facility] Sincerely,\[facility] \[facility] Victor Manuel Honeycutt M.D., FAC, [facility] Director, Celiac Program at [facility]  of Medicine\[facility] [employer] at [facility] Practice Director,[facility] - Gastroenterology/Internal Medicine at [facility] [address]\[place], [place] [address] Tel: [phone]\[facility] Email: [email or web address] \[facility] \[facility] The attached note has been created using a voice recognition system (Dragon).  There may be some misspellings and typos.  Please call my office if you have any issues or questions.

## 2020-04-28 NOTE — HISTORY OF PRESENT ILLNESS
[Home] : at home, [unfilled] , at the time of the visit. [Other Location: e.g. Home (Enter Location, City,State)___] : at [unfilled] [Patient] : the patient [FreeTextEntry1] : The patient complains of 5 weeks of abdominal pain and constipation.  The symptoms date back to the beginning of the quarantine for the COVID-19 pandemic.  During this time, the patient admits to eating more poorly, exercising significantly less, and having significantly more stress.  She states that after she eats, she develops right upper quadrant pain that extends with bloating down to the right lower quadrant along with abdominal distention.  She notes that her bowel movements are incomplete.  She is gone 2 to 3 days without a bowel movement with a baseline of a daily bowel movement.  She has tried glycerin suppositories and 4 weeks ago, she tried an enema.  Yesterday, after speaking with me, she drank magnesium citrate.  She only took about half the bottle but did have multiple bowel movements and gas and feels better after this.  She denies melena or bright red blood per rectum.  She denies heartburn or dysphasia.  The patient's weight is stable.  The patient is just over a year postpartum and is still breast-feeding.  The patient has not been admitted to the hospital in the past year and denies any cardiac issues.\par \par \par Note from 7/18/2019 - The patient is a 34-year-old woman who gave birth to a baby 3-1/2 months ago. One month later, the patient developed diarrhea which lasted less than a day. The patient then developed rectal soreness. The next day, she was constipated and strain and digitally removed stool. Since then, the patient has had pain with every bowel movement. She also saw bright red blood on the stool and toilet paper which has been improving. She has one to 2 bowel movements a day. She increased her fiber in her diet and noticed improvement in the pain although it has worsened a little recently. She denies abdominal pain, heartburn, or dysphagia. The patient has been losing her baby weight. The patient has not been hospitalized in the past year and denies any cardiac issues.

## 2020-04-28 NOTE — ASSESSMENT
[FreeTextEntry1] : Patient with complaints of abdominal pain and constipation.  The pain is predominantly right sided beginning in the right upper quadrant and occurring after eating.  She also has abdominal distention.  Although it is very possible that the symptoms are related to the quarantine situation with increased stress, poor eating habits, and less exercise, we will consider other etiologies.\par \par Patient was sent for a CT scan of the abdomen and pelvis.\par \par Bloodwork will be sent for CBC, chem-pack, TSH, celiac markers.\par \par Patient was advised to take MiraLAX 17 g a day.\par \par I will re-see the patient in 1 month.  If she is having ongoing symptoms, we will consider EGD and/or colonoscopy.\par \par \par Plan from 7/18/2019 - Patient 3-1/2 months postpartum with her 6 child. She has rectal pain and bleeding. Rectal examination reveals external hemorrhoids with the presence of an anal fissure. Stool is guaiac positive. Presumably, the blood is a consequence of the hemorrhoids and fissure.\par \par Patient was counseled regarding the importance of fiber and water. She was advised to use Citrucel powder daily as a fiber supplement and to drink at least 64 ounces of water a day.\par \par Patient was given hydrocortisone 2-1/2% cream to apply to the external hemorrhoids and hydrocortisone suppositories 25 mg. She was advised used to cream 3 times a day and the suppositories twice a day for 10 days.\par \par Patient was advised to contact me if her symptoms worsen or do not improve. Otherwise, the patient will return to see me in one month. At that time, we will assess to be sure that the stool does not remain positive for blood.

## 2020-05-18 ENCOUNTER — APPOINTMENT (OUTPATIENT)
Dept: GASTROENTEROLOGY | Facility: CLINIC | Age: 35
End: 2020-05-18
Payer: COMMERCIAL

## 2020-05-18 PROCEDURE — 99214 OFFICE O/P EST MOD 30 MIN: CPT | Mod: 95

## 2020-05-18 RX ORDER — PROGESTERONE 200 MG/1
CAPSULE ORAL
Refills: 0 | Status: DISCONTINUED | COMMUNITY
End: 2020-05-18

## 2020-05-18 RX ORDER — LORATADINE 5 MG
17 TABLET,CHEWABLE ORAL
Refills: 0 | Status: ACTIVE | COMMUNITY

## 2020-05-18 NOTE — HISTORY OF PRESENT ILLNESS
[Home] : at home, [unfilled] , at the time of the visit. [Medical Office: ([facility])___] : at the medical office located in  [Patient] : the patient [FreeTextEntry1] : The patient was seen in follow-up from her last visit on April 28, 2020 at which time she had abdominal pain, constipation, and distention.  She never went for the recommended blood work with CT scan but has been taking MiraLAX 17 g a day.  She has been having 1-2 bowel movements a day and no longer has abdominal pain or distention.  The stools are thinner than usual.  The patient has gained weight.\par \par \par Note from 4/28/2020 - The patient complains of 5 weeks of abdominal pain and constipation.  The symptoms date back to the beginning of the quarantine for the COVID-19 pandemic.  During this time, the patient admits to eating more poorly, exercising significantly less, and having significantly more stress.  She states that after she eats, she develops right upper quadrant pain that extends with bloating down to the right lower quadrant along with abdominal distention.  She notes that her bowel movements are incomplete.  She is gone 2 to 3 days without a bowel movement with a baseline of a daily bowel movement.  She has tried glycerin suppositories and 4 weeks ago, she tried an enema.  Yesterday, after speaking with me, she drank magnesium citrate.  She only took about half the bottle but did have multiple bowel movements and gas and feels better after this.  She denies melena or bright red blood per rectum.  She denies heartburn or dysphasia.  The patient's weight is stable.  The patient is just over a year postpartum and is still breast-feeding.  The patient has not been admitted to the hospital in the past year and denies any cardiac issues.\par \par \par Note from 7/18/2019 - The patient is a 34-year-old woman who gave birth to a baby 3-1/2 months ago. One month later, the patient developed diarrhea which lasted less than a day. The patient then developed rectal soreness. The next day, she was constipated and strain and digitally removed stool. Since then, the patient has had pain with every bowel movement. She also saw bright red blood on the stool and toilet paper which has been improving. She has one to 2 bowel movements a day. She increased her fiber in her diet and noticed improvement in the pain although it has worsened a little recently. She denies abdominal pain, heartburn, or dysphagia. The patient has been losing her baby weight. The patient has not been hospitalized in the past year and denies any cardiac issues.

## 2020-05-18 NOTE — ASSESSMENT
[FreeTextEntry1] : Patient with improvement in her constipation, abdominal pain, and distention on MiraLAX daily.  Her stools are thinner than usual.  Although we are under the assumption that the patient's change in bowel habits is related to factors associated with the current COVID-19 pandemic, that is increased stress as the patient has 6 kids under the age of 9 in the household, decreased exercise, and changed eating habits, we must consider other etiologies.\par \par The patient will continue MiraLAX 17 g a day.\par \par I have again advised the patient to get the recommended blood work.  We can defer the CT scan as the patient no longer has abdominal pain.\par \par Patient was advised to increase her exercise.\par \par I will re-see the patient in 1 month.  If her symptoms persist or her labs are abnormal, the patient will require a colonoscopy.\par \par \par Plan from 4/28/2020 - Patient with complaints of abdominal pain and constipation.  The pain is predominantly right sided beginning in the right upper quadrant and occurring after eating.  She also has abdominal distention.  Although it is very possible that the symptoms are related to the quarantine situation with increased stress, poor eating habits, and less exercise, we will consider other etiologies.\par \par Patient was sent for a CT scan of the abdomen and pelvis.\par \par Bloodwork will be sent for CBC, chem-pack, TSH, celiac markers.\par \par Patient was advised to take MiraLAX 17 g a day.\par \par I will re-see the patient in 1 month.  If she is having ongoing symptoms, we will consider EGD and/or colonoscopy.\par \par \par Plan from 7/18/2019 - Patient 3-1/2 months postpartum with her 6 child. She has rectal pain and bleeding. Rectal examination reveals external hemorrhoids with the presence of an anal fissure. Stool is guaiac positive. Presumably, the blood is a consequence of the hemorrhoids and fissure.\par \par Patient was counseled regarding the importance of fiber and water. She was advised to use Citrucel powder daily as a fiber supplement and to drink at least 64 ounces of water a day.\par \par Patient was given hydrocortisone 2-1/2% cream to apply to the external hemorrhoids and hydrocortisone suppositories 25 mg. She was advised used to cream 3 times a day and the suppositories twice a day for 10 days.\par \par Patient was advised to contact me if her symptoms worsen or do not improve. Otherwise, the patient will return to see me in one month. At that time, we will assess to be sure that the stool does not remain positive for blood.

## 2020-05-18 NOTE — CONSULT LETTER
[FreeTextEntry1] : Dear Dr. Zane Main,\[facility] \[facility] I had the pleasure of seeing your patient JACINTO RIVERA in the office today.  My office note is attached. PLEASE READ THE "ASSESSMENT" SECTION OF THE NOTE TO SEE MY IMPRESSION AND PLAN.\[facility] \[facility] Thank you very much for allowing me to participate in the care of your patient.\[facility] \[facility] Since[facility],\[facility] \[facility] Victor Manuel Honeycutt M.D., FAC, [facility] Director, Celiac Program at [facility]  of Medicine\[facility] and [employer] at [facility] Practice Director,[facility] - Gastroenterology/Internal Medicine at [facility] [address]\[place], [place] [address] Tel: [phone]\[facility] Email: [email or web address] \[facility] \[facility] The attached note has been created using a voice recognition system (Dragon).  There may be some misspellings and typos.  Please call my office if you have any issues or questions.

## 2020-05-20 LAB
ALBUMIN SERPL ELPH-MCNC: 4.6 G/DL
ALP BLD-CCNC: 70 U/L
ALT SERPL-CCNC: 12 U/L
ANION GAP SERPL CALC-SCNC: 13 MMOL/L
AST SERPL-CCNC: 11 U/L
BASOPHILS # BLD AUTO: 0.08 K/UL
BASOPHILS NFR BLD AUTO: 1 %
BILIRUB SERPL-MCNC: 0.2 MG/DL
BUN SERPL-MCNC: 21 MG/DL
CALCIUM SERPL-MCNC: 9.2 MG/DL
CHLORIDE SERPL-SCNC: 102 MMOL/L
CO2 SERPL-SCNC: 26 MMOL/L
CREAT SERPL-MCNC: 0.65 MG/DL
ENDOMYSIUM IGA SER QL: NEGATIVE
ENDOMYSIUM IGA TITR SER: NORMAL
EOSINOPHIL # BLD AUTO: 0.33 K/UL
EOSINOPHIL NFR BLD AUTO: 4.2 %
GLIADIN IGA SER QL: <5 UNITS
GLIADIN IGG SER QL: <5 UNITS
GLIADIN PEPTIDE IGA SER-ACNC: NEGATIVE
GLIADIN PEPTIDE IGG SER-ACNC: NEGATIVE
GLUCOSE SERPL-MCNC: 98 MG/DL
HCT VFR BLD CALC: 39.1 %
HGB BLD-MCNC: 12.5 G/DL
IGA SER QL IEP: 170 MG/DL
IMM GRANULOCYTES NFR BLD AUTO: 0.1 %
LYMPHOCYTES # BLD AUTO: 2.87 K/UL
LYMPHOCYTES NFR BLD AUTO: 36.2 %
MAN DIFF?: NORMAL
MCHC RBC-ENTMCNC: 29.6 PG
MCHC RBC-ENTMCNC: 32 GM/DL
MCV RBC AUTO: 92.4 FL
MONOCYTES # BLD AUTO: 0.55 K/UL
MONOCYTES NFR BLD AUTO: 6.9 %
NEUTROPHILS # BLD AUTO: 4.09 K/UL
NEUTROPHILS NFR BLD AUTO: 51.6 %
PLATELET # BLD AUTO: 261 K/UL
POTASSIUM SERPL-SCNC: 4.6 MMOL/L
PROT SERPL-MCNC: 6.9 G/DL
RBC # BLD: 4.23 M/UL
RBC # FLD: 12.6 %
SODIUM SERPL-SCNC: 141 MMOL/L
TSH SERPL-ACNC: 1.93 UIU/ML
TTG IGA SER IA-ACNC: <1.2 U/ML
TTG IGA SER-ACNC: NEGATIVE
TTG IGG SER IA-ACNC: 2.7 U/ML
TTG IGG SER IA-ACNC: NEGATIVE
WBC # FLD AUTO: 7.93 K/UL

## 2020-06-10 ENCOUNTER — APPOINTMENT (OUTPATIENT)
Dept: GASTROENTEROLOGY | Facility: CLINIC | Age: 35
End: 2020-06-10
Payer: COMMERCIAL

## 2020-06-10 VITALS
HEIGHT: 59 IN | OXYGEN SATURATION: 97 % | HEART RATE: 82 BPM | TEMPERATURE: 99.1 F | BODY MASS INDEX: 27.01 KG/M2 | DIASTOLIC BLOOD PRESSURE: 70 MMHG | WEIGHT: 134 LBS | SYSTOLIC BLOOD PRESSURE: 120 MMHG

## 2020-06-10 DIAGNOSIS — K59.00 CONSTIPATION, UNSPECIFIED: ICD-10-CM

## 2020-06-10 DIAGNOSIS — R19.4 CHANGE IN BOWEL HABIT: ICD-10-CM

## 2020-06-10 PROCEDURE — 99214 OFFICE O/P EST MOD 30 MIN: CPT

## 2020-06-10 RX ORDER — SODIUM PICOSULFATE, MAGNESIUM OXIDE, AND ANHYDROUS CITRIC ACID 10; 3.5; 12 MG/160ML; G/160ML; G/160ML
10-3.5-12 MG-GM LIQUID ORAL
Qty: 1 | Refills: 0 | Status: ACTIVE | COMMUNITY
Start: 2020-06-10 | End: 1900-01-01

## 2020-06-11 ENCOUNTER — APPOINTMENT (OUTPATIENT)
Dept: GASTROENTEROLOGY | Facility: CLINIC | Age: 35
End: 2020-06-11
Payer: COMMERCIAL

## 2020-06-11 ENCOUNTER — APPOINTMENT (OUTPATIENT)
Dept: GASTROENTEROLOGY | Facility: CLINIC | Age: 35
End: 2020-06-11

## 2020-06-11 VITALS
HEIGHT: 59 IN | TEMPERATURE: 98.3 F | DIASTOLIC BLOOD PRESSURE: 70 MMHG | HEART RATE: 86 BPM | SYSTOLIC BLOOD PRESSURE: 112 MMHG | WEIGHT: 132 LBS | OXYGEN SATURATION: 98 % | BODY MASS INDEX: 26.61 KG/M2

## 2020-06-11 DIAGNOSIS — R10.9 UNSPECIFIED ABDOMINAL PAIN: ICD-10-CM

## 2020-06-11 DIAGNOSIS — R11.0 NAUSEA: ICD-10-CM

## 2020-06-11 PROCEDURE — 99215 OFFICE O/P EST HI 40 MIN: CPT

## 2020-06-11 RX ORDER — HYOSCYAMINE SULFATE 0.12 MG/1
0.12 TABLET, ORALLY DISINTEGRATING ORAL
Qty: 50 | Refills: 0 | Status: ACTIVE | COMMUNITY
Start: 2020-06-11 | End: 1900-01-01

## 2020-06-11 NOTE — PHYSICAL EXAM
[Abdomen Tenderness] : non-tender [General Appearance - Alert] : alert [General Appearance - In No Acute Distress] : in no acute distress [Neck Appearance] : the appearance of the neck was normal [Neck Cervical Mass (___cm)] : no neck mass was observed [Thyroid Diffuse Enlargement] : the thyroid was not enlarged [Jugular Venous Distention Increased] : there was no jugular-venous distention [Thyroid Nodule] : there were no palpable thyroid nodules [Auscultation Breath Sounds / Voice Sounds] : lungs were clear to auscultation bilaterally [Heart Sounds Gallop] : no gallops [Heart Sounds] : normal S1 and S2 [Heart Rate And Rhythm] : heart rate was normal and rhythm regular [Murmurs] : no murmurs [Heart Sounds Pericardial Friction Rub] : no pericardial rub [Edema] : there was no peripheral edema [Bowel Sounds] : normal bowel sounds [Abdomen Soft] : soft [] : no hepato-splenomegaly [Abdomen Mass (___ Cm)] : no abdominal mass palpated [No CVA Tenderness] : no ~M costovertebral angle tenderness [No Spinal Tenderness] : no spinal tenderness [Oriented To Time, Place, And Person] : oriented to person, place, and time [Affect] : the affect was normal [Impaired Insight] : insight and judgment were intact

## 2020-06-11 NOTE — CONSULT LETTER
[FreeTextEntry1] : Dear Dr. Zane Main,\[facility] \[facility] I had the pleasure of seeing your patient JACINTO RIVERA in the office today.  My office note is attached. PLEASE READ THE "ASSESSMENT" SECTION OF THE NOTE TO SEE MY IMPRESSION AND PLAN.\[facility] \[facility] Thank you very much for allowing me to participate in the care of your patient.\[facility] \[facility] Since[facility],\[facility] \[facility] Victor Manuel Honeycutt M.D., FAC, [facility] Director, Celiac Program at [facility]  of Medicine\[facility] and [employer] at [facility] [place] Practice Director,[facility] - Gastroenterology/Internal Medicine at [facility] [address]\[place], [place] [address] Tel: [phone]\[facility] Email: [email or web address] \[facility] \[facility] The attached note has been created using a voice recognition system (Dragon).  There may be some misspellings and typos.  Please call my office if you have any issues or questions.

## 2020-06-11 NOTE — ASSESSMENT
[FreeTextEntry1] : Patient with new epigastric pain/spasms that began last night and continued this morning with associated nausea.\par \par I had a long discussion with the patient and her  regarding her symptoms.  Both of them believe that stress/anxiety are responsible for all of her GI symptoms.  I told her that I was in agreement with her seeing a therapist tonight and also suggested a psychiatrist.  She will discussed this with the therapist to see if they have a recommendation.\par \par We discussed the use of antianxiety agents, which the patient is hesitant to start.  I advised that a skilled psychiatrist would be best equipped to choose the right agent.\par \par In the meantime, the patient was given hyoscyamine 0.125 mg dissolvable pills to use as needed for spasm.\par \par Patient was sent for an abdominal ultrasound.\par \par We will perform an EGD at the same time as her scheduled colonoscopy coming up in 2 weeks.\par \par \par Plan from 6/10/2020 - Patient with couple of months of change in bowel habits with difficulty passing her stools and thinner stools.  This has been a bit improved over the last few days with decreased stress.  Given the prolonged course of the patient's symptoms, we no longer went over soon that this is due to stress even though it still very well may be.\par \par A colonoscopy has been scheduled. The risks, benefits, alternatives, and limitations of the procedure, including the possibility of missed lesions, were explained.\par \par Patient is scheduled to see Dr. Placido Esquivel on Friday for colorectal surgery evaluation.  He will assess for anorectal manometry and MRI defagography.\par \par \par Plan from 5/18/2020 - Patient with improvement in her constipation, abdominal pain, and distention on MiraLAX daily.  Her stools are thinner than usual.  Although we are under the assumption that the patient's change in bowel habits is related to factors associated with the current COVID-19 pandemic, that is increased stress as the patient has 6 kids under the age of 9 in the household, decreased exercise, and changed eating habits, we must consider other etiologies.\par \par The patient will continue MiraLAX 17 g a day.\par \par I have again advised the patient to get the recommended blood work.  We can defer the CT scan as the patient no longer has abdominal pain.\par \par Patient was advised to increase her exercise.\par \par I will re-see the patient in 1 month.  If her symptoms persist or her labs are abnormal, the patient will require a colonoscopy.\par \par \par Plan from 4/28/2020 - Patient with complaints of abdominal pain and constipation.  The pain is predominantly right sided beginning in the right upper quadrant and occurring after eating.  She also has abdominal distention.  Although it is very possible that the symptoms are related to the quarantine situation with increased stress, poor eating habits, and less exercise, we will consider other etiologies.\par \par Patient was sent for a CT scan of the abdomen and pelvis.\par \par Bloodwork will be sent for CBC, chem-pack, TSH, celiac markers.\par \par Patient was advised to take MiraLAX 17 g a day.\par \par I will re-see the patient in 1 month.  If she is having ongoing symptoms, we will consider EGD and/or colonoscopy.\par \par \par Plan from 7/18/2019 - Patient 3-1/2 months postpartum with her 6 child. She has rectal pain and bleeding. Rectal examination reveals external hemorrhoids with the presence of an anal fissure. Stool is guaiac positive. Presumably, the blood is a consequence of the hemorrhoids and fissure.\par \par Patient was counseled regarding the importance of fiber and water. She was advised to use Citrucel powder daily as a fiber supplement and to drink at least 64 ounces of water a day.\par \par Patient was given hydrocortisone 2-1/2% cream to apply to the external hemorrhoids and hydrocortisone suppositories 25 mg. She was advised used to cream 3 times a day and the suppositories twice a day for 10 days.\par \par Patient was advised to contact me if her symptoms worsen or do not improve. Otherwise, the patient will return to see me in one month. At that time, we will assess to be sure that the stool does not remain positive for blood.

## 2020-06-11 NOTE — ASSESSMENT
[FreeTextEntry1] : Patient with couple of months of change in bowel habits with difficulty passing her stools and thinner stools.  This has been a bit improved over the last few days with decreased stress.  Given the prolonged course of the patient's symptoms, we no longer went over soon that this is due to stress even though it still very well may be.\par \par A colonoscopy has been scheduled. The risks, benefits, alternatives, and limitations of the procedure, including the possibility of missed lesions, were explained.\par \par Patient is scheduled to see Dr. Placido Esquivel on Friday for colorectal surgery evaluation.  He will assess for anorectal manometry and MRI defagography.\par \par \par Plan from 5/18/2020 - Patient with improvement in her constipation, abdominal pain, and distention on MiraLAX daily.  Her stools are thinner than usual.  Although we are under the assumption that the patient's change in bowel habits is related to factors associated with the current COVID-19 pandemic, that is increased stress as the patient has 6 kids under the age of 9 in the household, decreased exercise, and changed eating habits, we must consider other etiologies.\par \par The patient will continue MiraLAX 17 g a day.\par \par I have again advised the patient to get the recommended blood work.  We can defer the CT scan as the patient no longer has abdominal pain.\par \par Patient was advised to increase her exercise.\par \par I will re-see the patient in 1 month.  If her symptoms persist or her labs are abnormal, the patient will require a colonoscopy.\par \par \par Plan from 4/28/2020 - Patient with complaints of abdominal pain and constipation.  The pain is predominantly right sided beginning in the right upper quadrant and occurring after eating.  She also has abdominal distention.  Although it is very possible that the symptoms are related to the quarantine situation with increased stress, poor eating habits, and less exercise, we will consider other etiologies.\par \par Patient was sent for a CT scan of the abdomen and pelvis.\par \par Bloodwork will be sent for CBC, chem-pack, TSH, celiac markers.\par \par Patient was advised to take MiraLAX 17 g a day.\par \par I will re-see the patient in 1 month.  If she is having ongoing symptoms, we will consider EGD and/or colonoscopy.\par \par \par Plan from 7/18/2019 - Patient 3-1/2 months postpartum with her 6 child. She has rectal pain and bleeding. Rectal examination reveals external hemorrhoids with the presence of an anal fissure. Stool is guaiac positive. Presumably, the blood is a consequence of the hemorrhoids and fissure.\par \par Patient was counseled regarding the importance of fiber and water. She was advised to use Citrucel powder daily as a fiber supplement and to drink at least 64 ounces of water a day.\par \par Patient was given hydrocortisone 2-1/2% cream to apply to the external hemorrhoids and hydrocortisone suppositories 25 mg. She was advised used to cream 3 times a day and the suppositories twice a day for 10 days.\par \par Patient was advised to contact me if her symptoms worsen or do not improve. Otherwise, the patient will return to see me in one month. At that time, we will assess to be sure that the stool does not remain positive for blood.

## 2020-06-11 NOTE — HISTORY OF PRESENT ILLNESS
[FreeTextEntry1] : After being seen yesterday, when the patient reported that she was feeling better and denied abdominal pain, the patient had coffee with milk and subsequently had diarrhea.  Last night she developed epigastric spasms with nausea but no vomiting.  She was unable to eat as food gave her nausea.  She drank apple cider vinegar and was able to sleep the night.  This morning, she ate eggs and had ongoing nausea and spasm.  She reports that the nausea sensation is actually been present for 1 to 2 weeks but she has never had epigastric pain like this.  She considered going to the emergency room.  After leaving the house with her , she reports that she calmed down and now everything feels better.  Of note, the patient is going for psychotherapy tonight.  She has tried therapy in the past without good results but this is a new therapist.\par \par \par Note from 6/10/2020 - Patient was on MiraLAX until 4 days ago, when she stopped.  She has since been taking Benefiber twice a day and drinking a lot of water.  She admits to having been under a great deal of stress but this is decreased over the past 4 days as the  is now taking care of her kids.  She now feels much better.  Before Sunday, the patient was having thinner bowel movements and difficulty passing them, giving her great concern for pelvic floor dysfunction.  Since Sunday, she has had 3 bowel movements a day.  She saw a uro-gynecologist on Monday we did not find anything abnormal but referred her to Dr. Placido Esquivel for colorectal surgery evaluation.  She has an appointment on Friday.  She does note right lower quadrant discomfort which is relieved by passage of the bowel movements.  She no longer has the right upper quadrant pain she had previously.  She denies heartburn, dysphasia, or vomiting.  She stopped breast-feeding yesterday.\par \par \par Note from 5/18/2020 - The patient was seen in follow-up from her last visit on April 28, 2020 at which time she had abdominal pain, constipation, and distention.  She never went for the recommended blood work with CT scan but has been taking MiraLAX 17 g a day.  She has been having 1-2 bowel movements a day and no longer has abdominal pain or distention.  The stools are thinner than usual.  The patient has gained weight.\par \par \par Note from 4/28/2020 - The patient complains of 5 weeks of abdominal pain and constipation.  The symptoms date back to the beginning of the quarantine for the COVID-19 pandemic.  During this time, the patient admits to eating more poorly, exercising significantly less, and having significantly more stress.  She states that after she eats, she develops right upper quadrant pain that extends with bloating down to the right lower quadrant along with abdominal distention.  She notes that her bowel movements are incomplete.  She is gone 2 to 3 days without a bowel movement with a baseline of a daily bowel movement.  She has tried glycerin suppositories and 4 weeks ago, she tried an enema.  Yesterday, after speaking with me, she drank magnesium citrate.  She only took about half the bottle but did have multiple bowel movements and gas and feels better after this.  She denies melena or bright red blood per rectum.  She denies heartburn or dysphasia.  The patient's weight is stable.  The patient is just over a year postpartum and is still breast-feeding.  The patient has not been admitted to the hospital in the past year and denies any cardiac issues.\par \par \par Note from 7/18/2019 - The patient is a 34-year-old woman who gave birth to a baby 3-1/2 months ago. One month later, the patient developed diarrhea which lasted less than a day. The patient then developed rectal soreness. The next day, she was constipated and strain and digitally removed stool. Since then, the patient has had pain with every bowel movement. She also saw bright red blood on the stool and toilet paper which has been improving. She has one to 2 bowel movements a day. She increased her fiber in her diet and noticed improvement in the pain although it has worsened a little recently. She denies abdominal pain, heartburn, or dysphagia. The patient has been losing her baby weight. The patient has not been hospitalized in the past year and denies any cardiac issues.

## 2020-06-11 NOTE — CONSULT LETTER
[FreeTextEntry1] : Dear Dr. Zane Main,\[facility] \[facility] I had the pleasure of seeing your patient JACINTO RIVERA in the office today.  My office note is attached. PLEASE READ THE "ASSESSMENT" SECTION OF THE NOTE TO SEE MY IMPRESSION AND PLAN.\[facility] \[facility] Thank you very much for allowing me to participate in the care of your patient.\[facility] \[facility] Sincerely,\[facility] \[facility] Victor Manuel Honeycutt M.D., FAC, [facility] Director, Celiac Program at [facility]  of Medicine\[facility] and [employer] at [facility] [place]/[facility] Practice Director,[facility]ician Partners - Gastroenterology/Internal Medicine at [facility] [address]\[place], [place] [address] Tel: [phone]\[facility] Email: [email or web address] \[facility] \[facility] The attached note has been created using a voice recognition system (Dragon).  There may be some misspellings and typos.  Please call my office if you have any issues or questions.

## 2020-06-11 NOTE — HISTORY OF PRESENT ILLNESS
[FreeTextEntry1] : Patient was on MiraLAX until 4 days ago, when she stopped.  She has since been taking Benefiber twice a day and drinking a lot of water.  She admits to having been under a great deal of stress but this is decreased over the past 4 days as the  is now taking care of her kids.  She now feels much better.  Before Sunday, the patient was having thinner bowel movements and difficulty passing them, giving her great concern for pelvic floor dysfunction.  Since Sunday, she has had 3 bowel movements a day.  She saw a uro-gynecologist on Monday we did not find anything abnormal but referred her to Dr. Placido Esquivel for colorectal surgery evaluation.  She has an appointment on Friday.  She does note right lower quadrant discomfort which is relieved by passage of the bowel movements.  She no longer has the right upper quadrant pain she had previously.  She denies heartburn, dysphasia, or vomiting.  She stopped breast-feeding yesterday.\par \par \par Note from 5/18/2020 - The patient was seen in follow-up from her last visit on April 28, 2020 at which time she had abdominal pain, constipation, and distention.  She never went for the recommended blood work with CT scan but has been taking MiraLAX 17 g a day.  She has been having 1-2 bowel movements a day and no longer has abdominal pain or distention.  The stools are thinner than usual.  The patient has gained weight.\par \par \par Note from 4/28/2020 - The patient complains of 5 weeks of abdominal pain and constipation.  The symptoms date back to the beginning of the quarantine for the COVID-19 pandemic.  During this time, the patient admits to eating more poorly, exercising significantly less, and having significantly more stress.  She states that after she eats, she develops right upper quadrant pain that extends with bloating down to the right lower quadrant along with abdominal distention.  She notes that her bowel movements are incomplete.  She is gone 2 to 3 days without a bowel movement with a baseline of a daily bowel movement.  She has tried glycerin suppositories and 4 weeks ago, she tried an enema.  Yesterday, after speaking with me, she drank magnesium citrate.  She only took about half the bottle but did have multiple bowel movements and gas and feels better after this.  She denies melena or bright red blood per rectum.  She denies heartburn or dysphasia.  The patient's weight is stable.  The patient is just over a year postpartum and is still breast-feeding.  The patient has not been admitted to the hospital in the past year and denies any cardiac issues.\par \par \par Note from 7/18/2019 - The patient is a 34-year-old woman who gave birth to a baby 3-1/2 months ago. One month later, the patient developed diarrhea which lasted less than a day. The patient then developed rectal soreness. The next day, she was constipated and strain and digitally removed stool. Since then, the patient has had pain with every bowel movement. She also saw bright red blood on the stool and toilet paper which has been improving. She has one to 2 bowel movements a day. She increased her fiber in her diet and noticed improvement in the pain although it has worsened a little recently. She denies abdominal pain, heartburn, or dysphagia. The patient has been losing her baby weight. The patient has not been hospitalized in the past year and denies any cardiac issues.

## 2020-06-22 ENCOUNTER — APPOINTMENT (OUTPATIENT)
Dept: GASTROENTEROLOGY | Facility: AMBULATORY MEDICAL SERVICES | Age: 35
End: 2020-06-22

## 2021-06-10 NOTE — PATIENT PROFILE OB - AMNIOTIC FLUID ODOR, LABOR
Is This A New Presentation, Or A Follow-Up?: Skin Lesions How Severe Is Your Skin Lesion?: mild Have Your Skin Lesions Been Treated?: not been treated normal

## 2021-06-25 NOTE — REVIEW OF SYSTEMS
Yes she can be scheduled in dermatology. [Recent Weight Loss (___ Lbs)] : recent [unfilled] ~Ulb weight loss [As Noted in HPI] : as noted in HPI [Negative] : Heme/Lymph

## 2021-09-20 NOTE — DISCHARGE NOTE OB - PRINCIPAL DIAGNOSIS
(normal spontaneous vaginal delivery)
no back pain, no gout, no musculoskeletal pain, no neck pain, and no weakness.

## 2021-12-21 NOTE — OB RN TRIAGE NOTE - COMFORT/ACCEPTABLE PAIN LEVEL (0-10)
Fetus very active  No c/o  US today  Declined cerclage  Has not been using progesterone as recommended 5

## 2023-06-12 NOTE — DISCHARGE NOTE OB - ABILITY TO HEAR (WITH HEARING AID OR HEARING APPLIANCE IF NORMALLY USED):
Adequate: hears normal conversation without difficulty V-Y Plasty Text: The defect edges were debeveled with a #15 scalpel blade. Given the location of the defect, shape of the defect and the proximity to free margins an V-Y advancement flap was deemed most appropriate. Using a sterile surgical marker, an appropriate advancement flap was drawn incorporating the defect and placing the expected incisions within the relaxed skin tension lines where possible. The area thus outlined was incised deep to adipose tissue with a #15 scalpel blade. The skin margins were undermined to an appropriate distance in all directions utilizing iris scissors. Following this, the designed flap was advanced and carried over into the primary defect and sutured into place.